# Patient Record
Sex: FEMALE | Race: WHITE | NOT HISPANIC OR LATINO | URBAN - METROPOLITAN AREA
[De-identification: names, ages, dates, MRNs, and addresses within clinical notes are randomized per-mention and may not be internally consistent; named-entity substitution may affect disease eponyms.]

---

## 2022-01-04 ENCOUNTER — TELEPHONE (OUTPATIENT)
Dept: UROGYNECOLOGY | Facility: CLINIC | Age: 67
End: 2022-01-04
Payer: MEDICARE

## 2022-01-04 NOTE — TELEPHONE ENCOUNTER
----- Message from Susan Rizzo sent at 1/4/2022  3:14 PM CST -----  Pt is calling to reschedule appt on 1/11  Pt can be contacted at [phone]

## 2022-03-24 ENCOUNTER — OFFICE VISIT (OUTPATIENT)
Dept: UROGYNECOLOGY | Facility: CLINIC | Age: 67
End: 2022-03-24
Payer: MEDICARE

## 2022-03-24 VITALS
SYSTOLIC BLOOD PRESSURE: 130 MMHG | DIASTOLIC BLOOD PRESSURE: 65 MMHG | BODY MASS INDEX: 8.85 KG/M2 | WEIGHT: 46.88 LBS | HEIGHT: 61 IN

## 2022-03-24 DIAGNOSIS — R39.15 URINARY URGENCY: ICD-10-CM

## 2022-03-24 DIAGNOSIS — N95.2 VAGINAL ATROPHY: ICD-10-CM

## 2022-03-24 DIAGNOSIS — N30.10 IC (INTERSTITIAL CYSTITIS): Primary | ICD-10-CM

## 2022-03-24 PROCEDURE — 1125F AMNT PAIN NOTED PAIN PRSNT: CPT | Mod: CPTII,S$GLB,, | Performed by: OBSTETRICS & GYNECOLOGY

## 2022-03-24 PROCEDURE — 3288F FALL RISK ASSESSMENT DOCD: CPT | Mod: CPTII,S$GLB,, | Performed by: OBSTETRICS & GYNECOLOGY

## 2022-03-24 PROCEDURE — 1159F PR MEDICATION LIST DOCUMENTED IN MEDICAL RECORD: ICD-10-PCS | Mod: CPTII,S$GLB,, | Performed by: OBSTETRICS & GYNECOLOGY

## 2022-03-24 PROCEDURE — 3078F PR MOST RECENT DIASTOLIC BLOOD PRESSURE < 80 MM HG: ICD-10-PCS | Mod: CPTII,S$GLB,, | Performed by: OBSTETRICS & GYNECOLOGY

## 2022-03-24 PROCEDURE — 3075F SYST BP GE 130 - 139MM HG: CPT | Mod: CPTII,S$GLB,, | Performed by: OBSTETRICS & GYNECOLOGY

## 2022-03-24 PROCEDURE — 87186 SC STD MICRODIL/AGAR DIL: CPT | Performed by: OBSTETRICS & GYNECOLOGY

## 2022-03-24 PROCEDURE — 99204 PR OFFICE/OUTPT VISIT, NEW, LEVL IV, 45-59 MIN: ICD-10-PCS | Mod: S$GLB,,, | Performed by: OBSTETRICS & GYNECOLOGY

## 2022-03-24 PROCEDURE — 99999 PR PBB SHADOW E&M-EST. PATIENT-LVL III: CPT | Mod: PBBFAC,,, | Performed by: OBSTETRICS & GYNECOLOGY

## 2022-03-24 PROCEDURE — 1160F RVW MEDS BY RX/DR IN RCRD: CPT | Mod: CPTII,S$GLB,, | Performed by: OBSTETRICS & GYNECOLOGY

## 2022-03-24 PROCEDURE — 3008F PR BODY MASS INDEX (BMI) DOCUMENTED: ICD-10-PCS | Mod: CPTII,S$GLB,, | Performed by: OBSTETRICS & GYNECOLOGY

## 2022-03-24 PROCEDURE — 1101F PR PT FALLS ASSESS DOC 0-1 FALLS W/OUT INJ PAST YR: ICD-10-PCS | Mod: CPTII,S$GLB,, | Performed by: OBSTETRICS & GYNECOLOGY

## 2022-03-24 PROCEDURE — 99999 PR PBB SHADOW E&M-EST. PATIENT-LVL III: ICD-10-PCS | Mod: PBBFAC,,, | Performed by: OBSTETRICS & GYNECOLOGY

## 2022-03-24 PROCEDURE — 3008F BODY MASS INDEX DOCD: CPT | Mod: CPTII,S$GLB,, | Performed by: OBSTETRICS & GYNECOLOGY

## 2022-03-24 PROCEDURE — 87086 URINE CULTURE/COLONY COUNT: CPT | Performed by: OBSTETRICS & GYNECOLOGY

## 2022-03-24 PROCEDURE — 87077 CULTURE AEROBIC IDENTIFY: CPT | Performed by: OBSTETRICS & GYNECOLOGY

## 2022-03-24 PROCEDURE — 3078F DIAST BP <80 MM HG: CPT | Mod: CPTII,S$GLB,, | Performed by: OBSTETRICS & GYNECOLOGY

## 2022-03-24 PROCEDURE — 99204 OFFICE O/P NEW MOD 45 MIN: CPT | Mod: S$GLB,,, | Performed by: OBSTETRICS & GYNECOLOGY

## 2022-03-24 PROCEDURE — 1160F PR REVIEW ALL MEDS BY PRESCRIBER/CLIN PHARMACIST DOCUMENTED: ICD-10-PCS | Mod: CPTII,S$GLB,, | Performed by: OBSTETRICS & GYNECOLOGY

## 2022-03-24 PROCEDURE — 1159F MED LIST DOCD IN RCRD: CPT | Mod: CPTII,S$GLB,, | Performed by: OBSTETRICS & GYNECOLOGY

## 2022-03-24 PROCEDURE — 1101F PT FALLS ASSESS-DOCD LE1/YR: CPT | Mod: CPTII,S$GLB,, | Performed by: OBSTETRICS & GYNECOLOGY

## 2022-03-24 PROCEDURE — 3288F PR FALLS RISK ASSESSMENT DOCUMENTED: ICD-10-PCS | Mod: CPTII,S$GLB,, | Performed by: OBSTETRICS & GYNECOLOGY

## 2022-03-24 PROCEDURE — 87088 URINE BACTERIA CULTURE: CPT | Performed by: OBSTETRICS & GYNECOLOGY

## 2022-03-24 PROCEDURE — 1125F PR PAIN SEVERITY QUANTIFIED, PAIN PRESENT: ICD-10-PCS | Mod: CPTII,S$GLB,, | Performed by: OBSTETRICS & GYNECOLOGY

## 2022-03-24 PROCEDURE — 3075F PR MOST RECENT SYSTOLIC BLOOD PRESS GE 130-139MM HG: ICD-10-PCS | Mod: CPTII,S$GLB,, | Performed by: OBSTETRICS & GYNECOLOGY

## 2022-03-24 RX ORDER — MONTELUKAST SODIUM 10 MG/1
10 TABLET ORAL DAILY
COMMUNITY
Start: 2021-09-30

## 2022-03-24 RX ORDER — METHENAMINE, SODIUM PHOSPHATE, MONOBASIC, MONOHYDRATE, PHENYL SALICYLATE, METHYLENE BLUE, AND HYOSCYAMINE SULFATE 118; 40.8; 36; 10; .12 MG/1; MG/1; MG/1; MG/1; MG/1
1 CAPSULE ORAL 4 TIMES DAILY
Qty: 40 CAPSULE | Refills: 4 | Status: SHIPPED | OUTPATIENT
Start: 2022-03-24 | End: 2022-03-28

## 2022-03-24 RX ORDER — CONJUGATED ESTROGENS 0.62 MG/G
CREAM VAGINAL
Qty: 45 G | Refills: 12 | Status: SHIPPED | OUTPATIENT
Start: 2022-03-24

## 2022-03-24 RX ORDER — ACETAMINOPHEN 500 MG
50 TABLET ORAL ONCE
COMMUNITY
Start: 2019-01-01

## 2022-03-24 RX ORDER — PRAVASTATIN SODIUM 20 MG/1
20 TABLET ORAL DAILY
COMMUNITY
Start: 2021-12-04

## 2022-03-24 RX ORDER — ACETAMINOPHEN AND CODEINE PHOSPHATE 300; 30 MG/1; MG/1
30 TABLET ORAL ONCE AS NEEDED
COMMUNITY
End: 2022-03-24 | Stop reason: CLARIF

## 2022-03-24 RX ORDER — HYDROXYZINE HYDROCHLORIDE 10 MG/1
10 TABLET, FILM COATED ORAL 3 TIMES DAILY PRN
Qty: 30 TABLET | Refills: 11 | Status: SHIPPED | OUTPATIENT
Start: 2022-03-24

## 2022-03-24 NOTE — PROGRESS NOTES
Subjective:       Patient ID: Krystin Ordaz is a 67 y.o. female.    Chief Complaint:  Urinary Frequency    History of Present Illness  HPI 67 Y O F  has no past medical history on file. referred by a patient of this practice. She comes here as a second opinion.   She has seen Dr. Magdaleno Urologist in  who performed a cystoscope.  She has seen Dr. Menchaca ([facility] ). She has a history of kidney stones in November 2021.     Used in the past   Uribel- somewhat helpful  AZO - somewhat helpful  Myrbetriq - unclear if this was helpful   Unsure if she is not if she's used elavil     [facility]    Question 3/23/2022 12:11 PM CDT - Filed by Patient   General Urogynecology: Are you experiencing the following?    Dysuria (painful urination) No   Nocturia:  waking up at night to empty your bladder  Yes   If you answered yes to the previous question, how many times does this happen per night? 1-2   Enuresis (urine loss during sleep) No   Dribbling urine after you urinate No   Hematuria (urine appears red) No   Type of stream Strong   Urinary frequency: How often a day are you going to the bathroom per day?  10-15   Urinary Tract Infections: How many Urinary Tract Infections have you had in the past year? One (1) in the past year   If you have had a UTI in the past year, what treatments have you had so far?  Antibiotics   Urinary Incontinence (General): Are you experiencing the following?    Past consultation for incontinence: Have you ever seen someone for the evaluation of incontinence? No   If you answered yes to the previous question, please select all the therapies you have tried.  None of the above   Please note the effectiveness of the therapies.    Need to wear protection to keep clothes dry  No   If you answered yes to the previous question, please meghan the protection you use.  None   If you wear protection, how much wetness is typically on each pad? N/a- I do not wear protection   If you wear protection, how  "often do you have to change per day, if applicable?     Stress Symptoms: Are you experiencing the following?    Leakage of urine with cough, laugh and/or sneeze No   If you answered yes to the previous question, what is the frequency in days, weeks and/or months? Weekly   Leakage of urine with sex No   Leakage of urine with bending/ lifting No   Leakage of urine with briskly walking or jogging No   If you lose urine for any other reason not previously mentioned, please note it below, if applicable.     Urge Symptoms: Are you experiencing the following?    Urgency ("got to go" feeling) Yes   Urge: How frequently do you feel an urge to urinate (feeling like you "gotta go" to the bathroom and can't wait) Weekly   Do you experience a leakage of urine when you have a feeling of urgency?  No   Leakage of urine when unaware No   Past use of anticholinergics (medications used to treat overactive bladder) No   If you answered yes to the previous question, please meghan the anticholinergics you have used:     Have you ever used Mirbetriq (aka Mirabegron)?  Yes   Prolapse Symptoms: Are you experiencing any of the following?     Falling out/ Bulging/ Heaviness in the vagina No   Vaginal/ Abdominal Pain/ Pressure No   Need to strain/ Push to void No   Need to wait on the toilet before you void No   Unusual position to urinate (using your hands to push back the vaginal bulge) No   Sensation of incomplete emptying No   Past use of pessary device No   If you answered yes to the previous question, please list the devices you have used below.     Bowel Symptoms: Are you experiencing any of the following?    Constipation No   Diarrhea  No   Hematochezia (bloody stool) No   Incomplete evacuation of stool No   Involuntary loss of formed stool No   Fecal smearing/urgency No   Involuntary loss of gas Yes   Vaginal Symptoms: Are you experiencing any of the following?     Abnormal vaginal bleeding  No   Vaginal dryness No   Sexually active  " Yes   Dyspareunia (painful intercourse) No   Estrogen use  No       GYN & OB History  No LMP recorded. Patient is postmenopausal.   Date of Last Pap: No result found    OB History    Para Term  AB Living   2 2 2     2   SAB IAB Ectopic Multiple Live Births                  # Outcome Date GA Lbr Quang/2nd Weight Sex Delivery Anes PTL Lv   2 Term            1 Term              OB    C/s x 2  Largest: 6 # 12 oz       GYN  Menarche:  11  Menstrual cycle: n/  Menopause:  50's  Hysterectomy:  No  Ovaries:  present   Tubal ligation: Yes   Other abdominal surgeries: Inguinal  hernia repair     Review of Systems  Review of Systems   Constitutional: Negative.  Negative for activity change, appetite change, chills, diaphoresis, fatigue, fever and unexpected weight change.   HENT: Negative.    Eyes: Negative.    Respiratory: Negative.  Negative for apnea, cough and wheezing.    Cardiovascular: Negative.  Negative for chest pain and palpitations.   Gastrointestinal: Positive for constipation. Negative for abdominal distention, abdominal pain, anal bleeding, blood in stool, diarrhea, nausea, rectal pain and vomiting.   Endocrine: Negative.    Genitourinary: Positive for frequency and urgency. Negative for decreased urine volume, difficulty urinating, dyspareunia, dysuria, enuresis, flank pain, genital sores, hematuria, menstrual problem, pelvic pain, vaginal bleeding, vaginal discharge and vaginal pain.   Musculoskeletal: Negative for back pain and gait problem.   Skin: Negative for color change, pallor, rash and wound.   Allergic/Immunologic: Negative for immunocompromised state.   Neurological: Negative.  Negative for dizziness and speech difficulty.   Hematological: Negative for adenopathy.   Psychiatric/Behavioral: Negative for agitation, behavioral problems, confusion and sleep disturbance.           Objective:     Physical Exam   Constitutional: She is oriented to person, place, and time. She appears  well-developed.   HENT:   Head: Normocephalic and atraumatic.   Eyes: Conjunctivae and EOM are normal.   Cardiovascular: Normal rate, regular rhythm, S1 normal, S2 normal, normal heart sounds and intact distal pulses.   Pulmonary/Chest: Effort normal and breath sounds normal. She exhibits no tenderness.   Abdominal: Soft. Bowel sounds are normal. She exhibits no distension and no mass. There is no splenomegaly or hepatomegaly. There is no abdominal tenderness. There is no rigidity, no rebound and no guarding. No hernia.   Genitourinary: Pelvic exam was performed with patient supine. Rectum normal, vagina normal, uterus normal, cervix normal, skenes normal and bartholins normal. Right labia normal and left labia normal. Urethra exhibits hypermobility. Urethra exhibits no urethral caruncle, no urethral diverticulum and no urethral mass. Right bartholin is not enlarged and not tender. Left bartholin is not enlarged and not tender. Rectal exam shows resting tone normal and active tone normal. Rectal exam shows no external hemorrhoid, no fissure, no tenderness, anal tone normal and no dovetailing. Guaiac negative stool. There is lavator tenderness in the vagina. No foreign body, tenderness, bleeding, unspecified prolapse of vaginal walls, atrophy, fistula or mesh exposure in the vagina. Right adnexum displays no mass and no tenderness. Left adnexum displays no mass and no tenderness. Cervix exhibits no motion tenderness and no discharge. Uterus is not tender and not experiencing uterine prolapse.   Empty cough stress test: Negative.  Kegel: 2/5    POP-Q  Aa: -3 Ba: -3 C: -5   GH: 3 PB: 2 TVL: 9   Ap: -2 Bp: -2 D: -6                     Musculoskeletal:         General: Normal range of motion.      Cervical back: Normal range of motion and neck supple.   Neurological: She is alert and oriented to person, place, and time. She has normal strength and normal reflexes. Cranial nerves II through XII intact. No cranial nerve  deficit.   Skin: Skin is warm and dry.   Psychiatric: She has a normal mood and affect. Her speech is normal and behavior is normal. Judgment normal.        Bladder base tenderness        Assessment:        1. IC (interstitial cystitis)    2. Urinary urgency    3. Vaginal atrophy               Plan:       1. R/o IC   --Start the IC diet:  https://www.FooPetselp.org/wp-content/uploads/2015/07/food-list.pdf   --Start Hydroxyzine 10 mg relief of bladder pain at night   --start Uribel may take up to 4 times a day  as nedded for urinary discomfort   --Elmiron discussed, SE profile. Consider in future if no improvement     --Bladder instillations discussed    --Hydrodistention With Cystoscopy: allows physicians to take a much closer look at the bladder wall. While the AUA no longer suggests that it be used as a diagnostic method (unless a diagnosis is in doubt), hydrodistention may provide modest relief in IC symptoms which can last from three to six months. It can, however, be a more difficult, painful procedure. As a result, it requires careful consideration and discussion.   --Start pelvic pelvic floor PT ([facility])   --Release of medical records     2. Vaginal atrophy (dryness):  Use 1 gram of estrogen cream in vagina nightly x 2 weeks, then twice a week thereafter.    Approximately 45 minutes of total time spent in consult, 75 % in discussion. This includes face to face time and non-face to face time preparing to see the patient, obtaining and/or reviewing separately obtained history, documenting clinical information in the electronic or other health record, independently interpreting results (not separately reported) and communicating results to the patient/family/caregiver, or care coordination (not separately reported).      Thank you for requesting consultation of your patient.  I look forward to participating in her care.    Melody Lizarraga DO  Female Pelvic Medicine and Reconstructive Surgery  [facility]  [place], [place]

## 2022-03-24 NOTE — PATIENT INSTRUCTIONS
IC   --Start the IC diet:  https://www.ichelp.org/wp-content/uploads/2015/07/food-list.pdf   --Start Hydroxyzine 10 mg relief of bladder pain at night   --start Uribel may take up to 4 times a day  as nedded for urinary discomfort   --Elmiron discussed, SE profile. Consider in future if no improvement     --Bladder instillations discussed    --Hydrodistention With Cystoscopy: allows physicians to take a much closer look at the bladder wall. While the AUA no longer suggests that it be used as a diagnostic method (unless a diagnosis is in doubt), hydrodistention may provide modest relief in IC symptoms which can last from three to six months. It can, however, be a more difficult, painful procedure. As a result, it requires careful consideration and discussion.   --Start pelvic pelvic floor PT ([facility])   --Release of medical records     2. Vaginal atrophy (dryness):  Use 1 gram of estrogen cream in vagina nightly x 2 weeks, then twice a week thereafter.

## 2022-03-27 LAB — BACTERIA UR CULT: ABNORMAL

## 2022-03-28 ENCOUNTER — TELEPHONE (OUTPATIENT)
Dept: UROGYNECOLOGY | Facility: CLINIC | Age: 67
End: 2022-03-28
Payer: MEDICARE

## 2022-03-28 NOTE — TELEPHONE ENCOUNTER
Patient requesting medical release forms be sent to her.  Forms mailed to address on file.  Conversation ended.

## 2022-09-15 ENCOUNTER — TELEPHONE (OUTPATIENT)
Dept: UROGYNECOLOGY | Facility: CLINIC | Age: 67
End: 2022-09-15
Payer: MEDICARE

## 2022-09-15 DIAGNOSIS — R39.15 URINARY URGENCY: ICD-10-CM

## 2022-09-15 DIAGNOSIS — N30.10 IC (INTERSTITIAL CYSTITIS): Primary | ICD-10-CM

## 2022-09-15 NOTE — TELEPHONE ENCOUNTER
----- Message from Cheyanne Amin MA sent at 9/15/2022  1:50 PM CDT -----  I'll fax over once put in   ----- Message -----  From: Sarika Fernandes  Sent: 9/15/2022   1:43 PM CDT  To: [facility] Dos [facility] Staff    [facility] called to request an updated pelvic floor therapy referral on behalf of the pt.    Pls fax referral to [phone].

## 2022-09-15 NOTE — TELEPHONE ENCOUNTER
----- Message from Sarika Fernandes sent at 9/15/2022  1:42 PM CDT -----  [facility] called to request an updated pelvic floor therapy referral on behalf of the pt.    Pls fax referral to [phone].

## 2022-09-15 NOTE — TELEPHONE ENCOUNTER
----- Message from Sarika Fernandes sent at 9/15/2022  1:42 PM CDT -----  [facility] called to request an updated pelvic floor therapy referral on behalf of the pt.    Pls fax referral to [phone].

## 2023-05-31 ENCOUNTER — PATIENT MESSAGE (OUTPATIENT)
Dept: INFECTIOUS DISEASES | Facility: CLINIC | Age: 68
End: 2023-05-31
Payer: MEDICARE

## 2025-02-17 ENCOUNTER — TELEPHONE (OUTPATIENT)
Dept: UROGYNECOLOGY | Facility: CLINIC | Age: 70
End: 2025-02-17
Payer: MEDICARE

## 2025-02-17 NOTE — TELEPHONE ENCOUNTER
----- Message from Todd sent at 2/17/2025 11:47 AM CST -----  Type:  Patient Returning CallWho Called:PTWho Left Message for Patient:Does the patient know what this is regarding?:ALTERNATIVE MEDSWould the patient rather a call back or a response via [facility]? [facility] Call Back Number: [phone] Information: Pt states she would like a call back from office to discuss if she can take alternative meds. Pt states she would like to take oxybutymim instead of the  myrbetriq . Thank you

## 2025-02-21 ENCOUNTER — TELEPHONE (OUTPATIENT)
Dept: PHARMACY | Facility: CLINIC | Age: 70
End: 2025-02-21
Payer: MEDICARE

## 2025-02-21 NOTE — TELEPHONE ENCOUNTER
We have reviewed Ms. Ordaz current medication list and/or insurance status. Unfortunately, The Pharmacy Patient Assistance Team is unable to assist at this time due to the following reasons      There are no Manufacture or Co-pay Savings Programs available for requested medication.           Lindsay Lino  Pharmacy Patient Assistance Team

## 2025-02-21 NOTE — LETTER
February 21, 2025    Krystin Ordaz  [address]  [place] [address]         Dear Ms. Ordaz,      My name is Lindsay Lino.  I am reaching out on behalf of [facility]s Pharmacy Patient Assistance Team after receiving a referral from your Provider inquiring about assistance with your medication.  We have reviewed your current medication list and/or insurance status. Unfortunately, The Pharmacy Patient Assistance Team is unable to assist at this time due to the following reasons      There are no Manufacture or Co-pay Savings Programs available for requested medication.               Sincerely,   Lindsay CHAVEZ @[phone]  Pharmacy Patient Assistance  [address] [place] [address]  [id number] [place] [id number]  Fax: [phone]  Email: [email or web address]

## 2025-03-05 ENCOUNTER — TELEPHONE (OUTPATIENT)
Dept: UROGYNECOLOGY | Facility: CLINIC | Age: 70
End: 2025-03-05
Payer: MEDICARE

## 2025-03-05 NOTE — TELEPHONE ENCOUNTER
Contacted to inform will check on tier exception    ----- Message from Nettie sent at 3/5/2025 12:12 PM CST -----  Type: Patient Call BackWhalfredo called:pt What is the request in detail:calling to follow up on mirabegron (MYRBETRIQ) 50 mg Tb24 To see if the doctor was able to change tiers for prescription for cheaper price. Call pt Can the clinic reply by [facility]?Would the patient rather a call back or a response via [facility]:[phone] (home) Additional Information:

## 2025-03-06 ENCOUNTER — TELEPHONE (OUTPATIENT)
Dept: UROGYNECOLOGY | Facility: CLINIC | Age: 70
End: 2025-03-06
Payer: MEDICARE

## 2025-03-06 DIAGNOSIS — R39.15 URINARY URGENCY: ICD-10-CM

## 2025-03-06 DIAGNOSIS — N30.10 IC (INTERSTITIAL CYSTITIS): ICD-10-CM

## 2025-03-06 RX ORDER — MIRABEGRON 50 MG/1
50 TABLET, FILM COATED, EXTENDED RELEASE ORAL DAILY
Qty: 30 TABLET | Refills: 11 | Status: SHIPPED | OUTPATIENT
Start: 2025-03-06 | End: 2026-03-06

## 2025-03-06 NOTE — TELEPHONE ENCOUNTER
----- Message from Emily sent at 3/6/2025 12:18 PM CST -----  Name of Who is Calling:GIOVANI MANDEL [[id number]]  What is the request in detail: Pt calling because she missed a call in regards to her medication and just following up.Please call back to further assist.   Can the clinic reply by [facility]: No  What Number to Call Back if not in [facility]: [phone]

## 2025-03-13 ENCOUNTER — TELEPHONE (OUTPATIENT)
Dept: UROGYNECOLOGY | Facility: CLINIC | Age: 70
End: 2025-03-13
Payer: MEDICARE

## 2025-03-13 DIAGNOSIS — R39.15 URINARY URGENCY: ICD-10-CM

## 2025-03-13 DIAGNOSIS — N30.10 IC (INTERSTITIAL CYSTITIS): Primary | ICD-10-CM

## 2025-03-13 RX ORDER — OXYBUTYNIN CHLORIDE 10 MG/1
10 TABLET, EXTENDED RELEASE ORAL DAILY
Qty: 30 TABLET | Refills: 11 | Status: SHIPPED | OUTPATIENT
Start: 2025-03-13 | End: 2026-03-13

## 2025-03-13 NOTE — TELEPHONE ENCOUNTER
Returned call- patient stating she has 250 dollar deductible that is too expensive for medication. Patient states copay is only 47 dollars. Informed patient to contact insurance to get deductible lowered. Patient states will do that but in meantime would like a different medication.    ----- Message from Emily sent at 3/13/2025  4:02 PM CDT -----  Regarding: Rx concerns  Name of Who is Calling: Krystin What is the request in detail: Patient is requesting a call back to find out if she need to contact her insurance company for because of the MYRBETRIQ price. They said something about  tier 3.  Can the clinic reply by [facility]: No What Number to Call Back if not in [facility]: [phone]

## 2025-03-20 ENCOUNTER — TELEPHONE (OUTPATIENT)
Dept: UROGYNECOLOGY | Facility: CLINIC | Age: 70
End: 2025-03-20
Payer: MEDICARE

## 2025-03-20 NOTE — TELEPHONE ENCOUNTER
----- Message from Kathrine sent at 3/20/2025  9:11 AM CDT -----  Patient called inquiring if medication was approved by insurance. Patient says Dr. Campbell was checking with insurance to see if they would change the tier for medication. Patient was told to call back in a couple of weeks. Patient can be reached at [phone]

## 2025-03-20 NOTE — TELEPHONE ENCOUNTER
Returned call- informed patient needs to reach out to insurance to get deductible lowered. If not, Neha placed order for oxybutynin. Patient verbalized understanding and call ended.     ----- Message from Med Assistant Coombs sent at 3/20/2025 10:14 AM CDT -----  Please contact pt regarding tier exception.Thank you  ----- Message -----  From: Kathrine Segovia  Sent: 3/20/2025   9:13 AM CDT  To: [facility] Dos [facility] Staff    Patient called inquiring if medication was approved by insurance. Patient says Dr. Campbell was checking with insurance to see if they would change the tier for medication. Patient was told to call back in a couple of weeks. Patient can be reached at [phone]